# Patient Record
(demographics unavailable — no encounter records)

---

## 2024-10-14 NOTE — HISTORY OF PRESENT ILLNESS
[No Pertinent Cardiac History] : no history of aortic stenosis, atrial fibrillation, coronary artery disease, recent myocardial infarction, or implantable device/pacemaker [No Pertinent Pulmonary History] : no history of asthma, COPD, sleep apnea, or smoking [No Adverse Anesthesia Reaction] : no adverse anesthesia reaction in self or family member [(Patient denies any chest pain, claudication, dyspnea on exertion, orthopnea, palpitations or syncope)] : Patient denies any chest pain, claudication, dyspnea on exertion, orthopnea, palpitations or syncope [Chronic Anticoagulation] : no chronic anticoagulation [Chronic Kidney Disease] : no chronic kidney disease [Diabetes] : no diabetes [Good (7-10 METs)] : Good (7-10 METs) [FreeTextEntry1] : cholecystectomy [FreeTextEntry3] : [facility] [FreeTextEntry4] : Pt is a 52 yr old female presents for medical clearance for gallbladder removal, PST and EKG completed prior to visit.   Patient reports compliance to cholesterol meds and reports no symptoms after meals or otherwise. Patient reports no known allergies to anesthesia, Hx of complications, or Hx of difficulty to control bleeding. Patient denies taking any omega-3s at this time. Pt reports being able to walk up two flights of steps without SOB.  Denies any CP, chest tightness or SOB. Denies any abdominal pain, urinary symptom, or change in bowel habits. Denies any fever, chills, or night sweats. [FreeTextEntry5] : heart murmur, Hx of mitral valve prolapse

## 2024-10-14 NOTE — ASSESSMENT
[Patient Optimized for Surgery] : Patient optimized for surgery [No Further Testing Recommended] : no further testing recommended [FreeTextEntry4] : - PST and EKG completed prior to visit

## 2025-02-05 NOTE — ADDENDUM
Pharmacy Note  ED Culture Follow-up    Teodora Hills is a 51 y.o. female.     Allergies: Butorphanol tartrate, Stadol [butorphanol tartrate], Adhesive tape, Gabapentin, and Erythromycin     Current antimicrobials:   Reviewed patient's genital STD culture - culture is negative. Patient was appropriately discharged on no antimicrobial therapy. No further action needed.     Please call with any questions. Ext. [phone]    Serenity Cespedes PharmD Candidate 2025 2/5/2025 11:33 AM        [FreeTextEntry1] : I, Ankush Zhao, acted as a scribe on behalf of Dr. Baljinder Molina MD, on 10/14/2024.   All medical entries made by the scribe were at my, Dr. Baljinder Molina MD, direction and personally dictated by me on 10/14/2024. I have reviewed the chart and agree that the record accurately reflects my personal performance of the history, physical exam, assessment and plan. I have also personally directed, reviewed, and agreed with the chart.